# Patient Record
Sex: MALE | Race: WHITE
[De-identification: names, ages, dates, MRNs, and addresses within clinical notes are randomized per-mention and may not be internally consistent; named-entity substitution may affect disease eponyms.]

---

## 2019-02-06 ENCOUNTER — HOSPITAL ENCOUNTER (EMERGENCY)
Dept: HOSPITAL 74 - JER | Age: 29
Discharge: HOME | End: 2019-02-06
Payer: COMMERCIAL

## 2019-02-06 VITALS — BODY MASS INDEX: 28.8 KG/M2

## 2019-02-06 VITALS — DIASTOLIC BLOOD PRESSURE: 72 MMHG | SYSTOLIC BLOOD PRESSURE: 132 MMHG | TEMPERATURE: 102.7 F | HEART RATE: 124 BPM

## 2019-02-06 DIAGNOSIS — J02.0: Primary | ICD-10-CM

## 2019-02-06 DIAGNOSIS — B95.0: ICD-10-CM

## 2019-02-06 NOTE — PDOC
Rapid Medical Evaluation


Chief Complaint: Cold Symptoms


Time Seen by Provider: 02/06/19 15:27


Medical Evaluation: 





02/06/19 15:27





I have performed a brief in-person evaluation of this patient.





The patient presents with a chief complaint of:sore throat w fever





Pertinent physical exam findings:R tonsilar enlargement w/ T 102 F





I have ordered the following:motrin, strep sent





The patient will proceed to the ED for further evaluation.





**Discharge Disposition





- Diagnosis


Pharyngitis


Qualifiers:


 Pharyngitis/tonsillitis etiology: other specified organisms Qualified Code(s): 

J02.8 - Acute pharyngitis due to other specified organisms








- Referrals





- Patient Instructions





- Post Discharge Activity

## 2019-02-06 NOTE — PDOC
History of Present Illness





- General


Chief Complaint: Cold Symptoms


Stated Complaint: FEVER


Time Seen by Provider: 02/06/19 15:27





- History of Present Illness


Initial Comments: 





02/06/19 16:29


When he 8-year-old male without comorbidities presents for evaluation of sore 

throat with fever at home.





Past History





- Past Medical History


Allergies/Adverse Reactions: 


 Allergies











Allergy/AdvReac Type Severity Reaction Status Date / Time


 


No Known Allergies Allergy   Verified 02/06/19 16:25











Home Medications: 


Ambulatory Orders





NK [No Known Home Medication]  02/06/19 








COPD: No


CHF: No





- Suicide/Smoking/Psychosocial Hx


Smoking History: Never smoked


Have you smoked in the past 12 months: No


Information on smoking cessation initiated: No


Hx Alcohol Use: No


Drug/Substance Use Hx: No





**Review of Systems





- Review of Systems


Constitutional: Yes: Fever


HEENTM: Yes: Throat Pain





*Physical Exam





- Vital Signs


 Last Vital Signs











Temp Pulse Resp BP Pulse Ox


 


 102.7 F H  124 H  19   132/72   99 


 


 02/06/19 15:26  02/06/19 15:26  02/06/19 15:26  02/06/19 15:26  02/06/19 15:26














- Physical Exam


Comments: 





02/06/19 16:29


HEAD: NC/AT


EYES: Conjuntiva clear


NOSE: No d/c


THROAT: Moist mucous membrances, oral erythematous with exudate, uvula midline


MS: Full ROM in all joints without edema 


NEUROLOGIC: No gross sensory or motor deficits, NVID


SKIN: Normal color and temperature no lesions or rashes





Moderate Sedation





- Procedure Monitoring


Vital Signs: 


Procedure Monitoring Vital Signs











Temperature  102.7 F H  02/06/19 15:26


 


Pulse Rate  124 H  02/06/19 15:26


 


Respiratory Rate  19   02/06/19 15:26


 


Blood Pressure  132/72   02/06/19 15:26


 


O2 Sat by Pulse Oximetry (%)  99   02/06/19 15:26











ED Treatment Course





- Medications


Given in the ED: 


ED Medications














Discontinued Medications














Generic Name Dose Route Start Last Admin





  Trade Name Freq  PRN Reason Stop Dose Admin


 


Ibuprofen  800 mg  02/06/19 15:27  02/06/19 15:53





  Motrin -  PO  02/06/19 15:28  800 mg





  ONCE ONE   Administration





     





     





     





     














*DC/Admit/Observation/Transfer


Diagnosis at time of Disposition: 


 Strep pharyngitis





Pharyngitis


Qualifiers:


 Pharyngitis/tonsillitis etiology: other specified organisms Qualified Code(s): 

J02.8 - Acute pharyngitis due to other specified organisms








- Discharge Dispostion


Disposition: HOME


Condition at time of disposition: Stable


Decision to Admit order: No





- Referrals


Referrals: 


Francis Muñoz [Non Staff, Medical] - 





- Patient Instructions


Printed Discharge Instructions:  Strep Throat, DI for Strep Throat


Additional Instructions: 


You were treated today with a single dose of penicillin which will cure your 

strep throat. He did not require further treatment. He will also given a long-

acting steroid in the emergency room which will help her pain and fever. He may 

use warm salt water gargles 5-6 times a day for throat pain is take Tylenol as 

needed and as directed. Return to the emergency room should symptoms worsen or 

go unresolved and follow-up with your primary care provider in one to 2 days 

for further evaluation and treatment options.





- Post Discharge Activity

## 2019-07-01 ENCOUNTER — HOSPITAL ENCOUNTER (EMERGENCY)
Dept: HOSPITAL 74 - JERFT | Age: 29
Discharge: HOME | End: 2019-07-01
Payer: COMMERCIAL

## 2019-07-01 VITALS — SYSTOLIC BLOOD PRESSURE: 131 MMHG | TEMPERATURE: 98.3 F | HEART RATE: 76 BPM | DIASTOLIC BLOOD PRESSURE: 81 MMHG

## 2019-07-01 VITALS — BODY MASS INDEX: 31.4 KG/M2

## 2019-07-01 DIAGNOSIS — L23.9: Primary | ICD-10-CM

## 2019-07-01 NOTE — PDOC
History of Present Illness





- General


Chief Complaint: Itching


Stated Complaint: ITCH


Time Seen by Provider: 07/01/19 21:16





- History of Present Illness


Initial Comments: 





07/01/19 21:50


28-year-old male without comorbidities presents for evaluation of a . Rash 

without systemic symptoms 2 days.








Past History





- Past Medical History


Allergies/Adverse Reactions: 


 Allergies











Allergy/AdvReac Type Severity Reaction Status Date / Time


 


No Known Allergies Allergy   Verified 07/01/19 21:18











Home Medications: 


Ambulatory Orders





NK [No Known Home Medication]  02/06/19 








COPD: No


CHF: No





- Suicide/Smoking/Psychosocial Hx


Smoking History: Never smoked


Have you smoked in the past 12 months: No


Hx Alcohol Use: No


Drug/Substance Use Hx: No





**Review of Systems





- Review of Systems


Constitutional: No: Fever


Integumentary: Yes: Pruritus, Rash





*Physical Exam





- Vital Signs


 Last Vital Signs











Temp Pulse Resp BP Pulse Ox


 


 98.3 F   76   18   131/81   99 


 


 07/01/19 21:14  07/01/19 21:14  07/01/19 21:14  07/01/19 21:14  07/01/19 21:14














- Physical Exam


Comments: 





07/01/19 21:51


HEAD: NC/AT


EYES: Conjuntiva clear


Ears: Canals and TM's normal


NOSE: No d/c


THROAT: Moist mucous membrances, oral pharanx clear, uvula midline


NECK: Supple without adenopathy


CARDIAC: S1 S2


LUNGS: CTA Full and Equal breath sounds


ABDOMEN: Soft NT ND


MS: Full ROM in all joints without edema 


NEUROLOGIC: No gross sensory or motor deficits, NVID


SKIN: Normal color and temperature no lesions sparse raised wheals about the 

back and arms





Medical Decision Making





- Medical Decision Making





07/01/19 21:52


This appears to be an ALLERGIC rash. I will treat with steroids and Benadryl 

and have him follow-up with dermatology





*DC/Admit/Observation/Transfer


Diagnosis at time of Disposition: 


 Rash due to allergy








- Discharge Dispostion


Disposition: HOME


Condition at time of disposition: Stable


Decision to Admit order: No





- Referrals


Referrals: 


Rubina Connolly MD [Staff Physician] - 





- Patient Instructions


Additional Instructions: 


This rash appears to be ALLERGIC. He was given a dose of a long-acting steroid. 

He will also given Benadryl. You may continue Benadryl at home and follow-up 

with dermatology without fail in 1-2 days. Return to the emergency room for 

worsening symptoms.





- Post Discharge Activity

## 2019-12-24 ENCOUNTER — HOSPITAL ENCOUNTER (EMERGENCY)
Dept: HOSPITAL 74 - JER | Age: 29
Discharge: HOME | End: 2019-12-24
Payer: COMMERCIAL

## 2019-12-24 VITALS — TEMPERATURE: 97.9 F | DIASTOLIC BLOOD PRESSURE: 67 MMHG | HEART RATE: 69 BPM | SYSTOLIC BLOOD PRESSURE: 114 MMHG

## 2019-12-24 VITALS — BODY MASS INDEX: 32.3 KG/M2

## 2019-12-24 DIAGNOSIS — N20.0: Primary | ICD-10-CM

## 2019-12-24 LAB
ALBUMIN SERPL-MCNC: 4.7 G/DL (ref 3.4–5)
ALP SERPL-CCNC: 116 U/L (ref 45–117)
ALT SERPL-CCNC: 74 U/L (ref 13–61)
ANION GAP SERPL CALC-SCNC: 3 MMOL/L (ref 8–16)
APPEARANCE UR: CLEAR
AST SERPL-CCNC: 29 U/L (ref 15–37)
BASOPHILS # BLD: 0.6 % (ref 0–2)
BILIRUB SERPL-MCNC: 0.8 MG/DL (ref 0.2–1)
BILIRUB UR STRIP.AUTO-MCNC: NEGATIVE MG/DL
BUN SERPL-MCNC: 13.8 MG/DL (ref 7–18)
CALCIUM SERPL-MCNC: 9.7 MG/DL (ref 8.5–10.1)
CHLORIDE SERPL-SCNC: 104 MMOL/L (ref 98–107)
CO2 SERPL-SCNC: 29 MMOL/L (ref 21–32)
COLOR UR: YELLOW
CREAT SERPL-MCNC: 0.9 MG/DL (ref 0.55–1.3)
DEPRECATED RDW RBC AUTO: 13.4 % (ref 11.9–15.9)
EOSINOPHIL # BLD: 1.2 % (ref 0–4.5)
GLUCOSE SERPL-MCNC: 100 MG/DL (ref 74–106)
HCT VFR BLD CALC: 48.9 % (ref 35.4–49)
HGB BLD-MCNC: 16.4 GM/DL (ref 11.7–16.9)
INR BLD: 1.09 (ref 0.83–1.09)
KETONES UR QL STRIP: NEGATIVE
LEUKOCYTE ESTERASE UR QL STRIP.AUTO: NEGATIVE
LYMPHOCYTES # BLD: 34 % (ref 8–40)
MCH RBC QN AUTO: 28.9 PG (ref 25.7–33.7)
MCHC RBC AUTO-ENTMCNC: 33.6 G/DL (ref 32–35.9)
MCV RBC: 86.2 FL (ref 80–96)
MONOCYTES # BLD AUTO: 5.2 % (ref 3.8–10.2)
NEUTROPHILS # BLD: 59 % (ref 42.8–82.8)
NITRITE UR QL STRIP: NEGATIVE
PH UR: 5 [PH] (ref 5–8)
PLATELET # BLD AUTO: 235 K/MM3 (ref 134–434)
PMV BLD: 8.3 FL (ref 7.5–11.1)
POTASSIUM SERPLBLD-SCNC: 4.7 MMOL/L (ref 3.5–5.1)
PROT SERPL-MCNC: 8.3 G/DL (ref 6.4–8.2)
PROT UR QL STRIP: NEGATIVE
PROT UR QL STRIP: NEGATIVE
PT PNL PPP: 12.9 SEC (ref 9.7–13)
RBC # BLD AUTO: 5.67 M/MM3 (ref 4–5.6)
SODIUM SERPL-SCNC: 137 MMOL/L (ref 136–145)
SP GR UR: 1.03 (ref 1.01–1.03)
UROBILINOGEN UR STRIP-MCNC: 1 MG/DL (ref 0.2–1)
WBC # BLD AUTO: 6.9 K/MM3 (ref 4–10)

## 2019-12-24 PROCEDURE — 3E033NZ INTRODUCTION OF ANALGESICS, HYPNOTICS, SEDATIVES INTO PERIPHERAL VEIN, PERCUTANEOUS APPROACH: ICD-10-PCS

## 2019-12-24 PROCEDURE — 3E033GC INTRODUCTION OF OTHER THERAPEUTIC SUBSTANCE INTO PERIPHERAL VEIN, PERCUTANEOUS APPROACH: ICD-10-PCS

## 2019-12-24 NOTE — PDOC
History of Present Illness





- General


Chief Complaint: Pain, Acute


Stated Complaint: RT SIDE PAIN


Time Seen by Provider: 12/24/19 09:43


History Source: Patient


Exam Limitations: No Limitations





Past History





- Travel


Traveled outside of the country in the last 30 days: No


Close contact w/someone who was outside of country & ill: No





- Past Medical History


Allergies/Adverse Reactions: 


 Allergies











Allergy/AdvReac Type Severity Reaction Status Date / Time


 


No Known Allergies Allergy   Verified 07/01/19 21:18











Home Medications: 


Ambulatory Orders





Ibuprofen 800 mg PO TID #30 tablet 12/24/19 


Tamsulosin HCl [Flomax] 0.4 mg PO DAILY #7 capsule 12/24/19 








COPD: No


CHF: No





- Immunization History


Immunization Up to Date: No





- Psycho Social/Smoking Cessation Hx


Smoking History: Never smoked


Have you smoked in the past 12 months: No


Information on smoking cessation initiated: No


Hx Alcohol Use: No


Drug/Substance Use Hx: No





**Review of Systems





- Review of Systems


Able to Perform ROS?: Yes


Comments:: 





12/24/19 17:08


CONSTITUTIONAL: 


Absent: fever, chills, diaphoresis, generalized weakness, malaise, loss of 

appetite


HEENT: 


Absent: rhinorrhea, nasal congestion, throat pain, throat swelling, difficulty 

swallowing, mouth swelling, ear pain, eye pain, visual Changes


CARDIOVASCULAR: 


Absent: chest pain, loss of consciousness, palpitations, irregular heart rate, 

peripheral edema


RESPIRATORY: 


Absent: cough, shortness of breath, dyspnea with exertion, orthopnea, wheezing, 

stridor, hemoptysis


GASTROINTESTINAL:


Absent: abdominal pain, abdominal distension, nausea, vomiting, diarrhea, 

constipation, melena, hematochezia


GENITOURINARY: 


Present: Right flank pain Absent: dysuria, frequency, urgency, hesitancy, 

hematuria,  genital pain


MUSCULOSKELETAL: 


Absent: myalgia, arthralgia, joint swelling


SKIN: 


Absent: rash, itching, pallor


HEMATOLOGIC/IMMUNOLOGIC: 


Absent: easy bleeding, easy bruising, lymphadenopathy, frequent infections


ENDOCRINE:


Absent: unexplained weight gain, unexplained weight loss, heat intolerance, 

cold intolerance


NEUROLOGIC: 


Absent: headache, focal weakness or paresthesias, dizziness, unsteady gait, 

seizure, mental status changes, bladder or bowel incontinence


PSYCHIATRIC: 


Absent: anxiety, depression, suicidal or homicidal ideation, hallucinations.


Is the patient limited English proficient: No





*Physical Exam





- Vital Signs


 Last Vital Signs











Temp Pulse Resp BP Pulse Ox


 


 97.9 F   69   18   114/67   98 


 


 12/24/19 08:52  12/24/19 08:52  12/24/19 08:52  12/24/19 08:52  12/24/19 08:52














- Physical Exam





12/24/19 17:08


GENERAL:


Well developed, well nourished. Awake and alert. No acute distress.


HEENT:


Normocephalic, atraumatic. PERRLA, EOMI. No conjunctival pallor. Sclera are non-

icteric. Moist mucous membranes. Oropharynx is clear.


NECK: 


Supple. Full ROM. No JVD. Carotid pulses 2+ and symmetric, without bruits. No 

thyromegaly. No lymphadenopathy.


CARDIOVASCULAR:


Regular rate and rhythm. No murmurs, rubs, or gallops. Distal pulses are 2+ and 

symmetric. 


PULMONARY: 


No evidence of respiratory distress. Lungs clear to auscultation bilaterally. 

No wheezing, rales or rhonchi.


ABDOMINAL:


TTP of the R flank without CVA tenderness. Soft. Non-tender. Non-distended. No 

rebound or guarding. No organomegaly. Normoactive bowel sounds. 


MUSCULOSKELETAL 


Normal range of motion at all joints. No bony deformities or tenderness. No CVA 

tenderness.


EXTREMITIES: 


No cyanosis. No clubbing. No edema. No calf tenderness.


SKIN: 


Warm and dry. Normal capillary refill. No rashes. No jaundice. 


NEUROLOGICAL: 


Alert, awake, appropriate. Cranial nerves 2-12 intact. No deficits to light 

touch and temperature in face, upper extremities and lower extremities. No 

motor deficits in the in face, upper extremities and lower extremities. 

Normoreflexic in the upper and lower extremities. Normal speech. Toes are down-

going bilaterally. Gait is normal without ataxia.


PSYCHIATRIC: 


Cooperative. Good eye contact. Appropriate mood and affect.





ED Treatment Course





- LABORATORY


CBC & Chemistry Diagram: 


 12/24/19 11:05





 12/24/19 11:05





Medical Decision Making





- Medical Decision Making





12/24/19 17:09


Patient is a 29-year-old male no past medical history who presents to the ER 

today for right-sided flank pain for 3 days.  He states that the pain started 

higher in his back and has now moved towards his lower abdomen.  He states that 

the pain is colicky and that it comes and goes.  Denies nausea, vomiting and 

diarrhea.  He states that the pain is currently a 5 out of 10 so he came to the 

ER for evaluation.  Denies fevers, chills, sore throat, earache, difficulty 

breathing, chest pain and urinary symptoms.





Past surgical history: None





A/P: Kidney stones


On exam patient with right-sided flank pain without CVA tenderness.  Patient 

with some discomfort in the right lower quadrant as well.


Lab work is unremarkable, BUN and creatinine are within normal limits.


Dry CT shows multiple small stones within the upper renal collection duct of 

the right side


We will treat as kidney stones at this time.  Patient given ibuprofen and 

Flomax and told to follow-up with his primary care doctor.


Urine is negative for infection


Discharge home


I discussed the physical exam findings, ancillary test results and final 

diagnoses with the patient. I answered all of the patient's questions. The 

patient was satisfied with the care received and felt comfortable with the 

discharge plan and treatment plan.  The Patient agrees to follow up with the 

primary care physician/specialist within 24-72 hours. Return precautions were 

given.





Discharge





- Discharge Information


Problems reviewed: Yes


Clinical Impression/Diagnosis: 


 Kidney stones





Condition: Stable


Disposition: HOME





- Admission


No





- Additional Discharge Information


Prescriptions: 


Ibuprofen 800 mg PO TID #30 tablet


Tamsulosin HCl [Flomax] 0.4 mg PO DAILY #7 capsule





- Follow up/Referral


Referrals: 


Alexx Tian MD [Staff Physician] - 


Kory Pickens MD [Staff Physician] - 





- Patient Discharge Instructions


Patient Printed Discharge Instructions:  DI for Kidney Stones


Additional Instructions: 


You were evaluated for your abdominal pain today.


Your CT scan shows kidney stones.


Please drink plenty of fluids.


You may take Motrin 800 mg every 8 hours as needed for pain.


Please take the Flomax once a day to help you pass the stone.


Please follow-up with your primary care doctor and urology this week for 

further treatment and evaluation of your symptoms





Return to the ER for fever, inability to urinate, worsening pain or if you have 

any changes in your symptoms.





- Post Discharge Activity


Work/Back to School Note:  Back to Work

## 2023-10-05 ENCOUNTER — HOSPITAL ENCOUNTER (EMERGENCY)
Dept: HOSPITAL 74 - JER | Age: 33
Discharge: HOME | End: 2023-10-05
Payer: COMMERCIAL

## 2023-10-05 VITALS — BODY MASS INDEX: 33 KG/M2

## 2023-10-05 VITALS
RESPIRATION RATE: 18 BRPM | DIASTOLIC BLOOD PRESSURE: 76 MMHG | SYSTOLIC BLOOD PRESSURE: 130 MMHG | TEMPERATURE: 98 F | HEART RATE: 77 BPM

## 2023-10-05 DIAGNOSIS — N50.812: ICD-10-CM

## 2023-10-05 DIAGNOSIS — L72.9: ICD-10-CM

## 2023-10-05 DIAGNOSIS — N50.82: Primary | ICD-10-CM

## 2023-10-05 LAB
APPEARANCE UR: CLEAR
BILIRUB UR STRIP.AUTO-MCNC: NEGATIVE MG/DL
COLOR UR: YELLOW
KETONES UR QL STRIP: NEGATIVE
LEUKOCYTE ESTERASE UR QL STRIP.AUTO: NEGATIVE
NITRITE UR QL STRIP: NEGATIVE
PH UR: 6 [PH] (ref 5–8)
PROT UR QL STRIP: NEGATIVE
PROT UR QL STRIP: NEGATIVE
SP GR UR: 1.03 (ref 1.01–1.03)
UROBILINOGEN UR STRIP-MCNC: 0.2 MG/DL (ref 0.2–1)